# Patient Record
Sex: FEMALE | Race: BLACK OR AFRICAN AMERICAN | NOT HISPANIC OR LATINO | URBAN - METROPOLITAN AREA
[De-identification: names, ages, dates, MRNs, and addresses within clinical notes are randomized per-mention and may not be internally consistent; named-entity substitution may affect disease eponyms.]

---
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
## 2023-07-24 ENCOUNTER — TELEPHONE (OUTPATIENT)
Dept: OBGYN CLINIC | Facility: CLINIC | Age: 18
End: 2023-07-24

## 2023-07-30 NOTE — PROGRESS NOTES
Assessment/Plan:  Open to air at bedtime. Cotton underwear. No thongs. Wear looser fitting clothing. Get out of exercise clothes and bathing suits ASAP. Avoid bathroom wipes, feminine washes/scented soaps. Wash with cetaphil cleanser. Watch sugar and carb intake. Refrain from sexual activity while on treatment. Flagyl 1 tab twice a day for 1 week no alcohol  Diflucan 1 now repeat in 3 days  Valtrex 1 twice a day for 3 days then daily    Start birth control on day one of next menses and use back up method of contraception x 7 days. Rx sent to pharmacy on file. Although always condom use encouraged. Benefits, risks and alternatives of birth control discussed. F/u 3 months if doing ok consider cont active pills only. Diagnoses and all orders for this visit:    Encounter for prescription of oral contraceptives  -     norethindrone-ethinyl estradiol (Junel FE 1/20) 1-20 MG-MCG per tablet; Take 1 tablet by mouth daily    BV (bacterial vaginosis)  -     metroNIDAZOLE (METROGEL) 0.75 % vaginal gel; Insert 1 Application into the vagina daily at bedtime for 5 days    Vaginal yeast infection  -     fluconazole (DIFLUCAN) 150 mg tablet; Take 1 tablet (150 mg total) by mouth once for 1 dose Repeat in 3 days    HSV (herpes simplex virus) anogenital infection  -     valACYclovir (VALTREX) 500 mg tablet; 1 tab twice a day for 3 days then daily    Screen for STD (sexually transmitted disease)  -     Chlamydia/GC amplified DNA by PCR    Other orders  -     Fluoxetine HCl, PMDD, 10 MG TABS; Take 10 mg by mouth daily          Subjective:      Patient ID: Noah Riggs is a 21 y.o. female. New pt here for eval outbreak Had [address] 5/22/2023 at prior OBGYN  No prior obgyn records for review  H/o HSV 2 first outbreak 2 months ago C/o discomfort in vagina intermittently Also noting an odor Has had BV in the past treated with po Flagyl Interested in discussing IUD.  Has sig cramps with period H/o ovarian cystectommy 5/2022 Depression/PMDD on Lexapro       The following portions of the patient's history were reviewed and updated as appropriate: allergies, current medications, past family history, past medical history, past social history, past surgical history and problem list.    Review of Systems   Constitutional: Negative for chills, fatigue and fever. Gastrointestinal: Negative for abdominal pain, constipation and diarrhea. Genitourinary: Positive for menstrual problem, vaginal discharge and vaginal pain. Objective:      BP 90/58   Ht 5' 6.5" (1.689 m)   Wt 83.8 kg (184 lb 12.8 oz)   LMP 07/05/2023 (Approximate)   Breastfeeding No   BMI 29.38 kg/m²          Physical Exam  Vitals and nursing note reviewed. Constitutional:       General: She is not in acute distress. Appearance: Normal appearance. HENT:      Head: Normocephalic and atraumatic. Pulmonary:      Effort: Pulmonary effort is normal.   Chest:   Breasts:     Breasts are symmetrical.      Right: Normal. No mass, nipple discharge, skin change or tenderness. Left: Normal. No mass, nipple discharge, skin change or tenderness. Abdominal:      General: There is no distension. Palpations: Abdomen is soft. Tenderness: There is no abdominal tenderness. There is no guarding or rebound. Genitourinary:     General: Normal vulva. Exam position: Lithotomy position. Labia:         Right: No rash, tenderness, lesion or injury. Left: No rash, tenderness, lesion or injury. Urethra: No prolapse, urethral pain, urethral swelling or urethral lesion. Vagina: Normal. No erythema or lesions. Cervix: No cervical motion tenderness, discharge, lesion or cervical bleeding. Uterus: Normal.       Adnexa: Right adnexa normal and left adnexa normal.        Right: No mass or tenderness. Left: No mass or tenderness. Rectum: No mass or external hemorrhoid.       Comments: G/c from cervix  Musculoskeletal:         General: Normal range of motion. Lymphadenopathy:      Upper Body:      Right upper body: No axillary adenopathy. Left upper body: No axillary adenopathy. Lower Body: No right inguinal adenopathy. No left inguinal adenopathy. Skin:     General: Skin is warm and dry. Neurological:      Mental Status: She is alert and oriented to person, place, and time. Psychiatric:         Mood and Affect: Mood normal.         Behavior: Behavior normal.         Thought Content:  Thought content normal.         Judgment: Judgment normal.

## 2023-07-31 ENCOUNTER — OFFICE VISIT (OUTPATIENT)
Dept: OBGYN CLINIC | Facility: CLINIC | Age: 18
End: 2023-07-31
Payer: COMMERCIAL

## 2023-07-31 VITALS
WEIGHT: 184.8 LBS | SYSTOLIC BLOOD PRESSURE: 90 MMHG | BODY MASS INDEX: 29 KG/M2 | DIASTOLIC BLOOD PRESSURE: 58 MMHG | HEIGHT: 67 IN

## 2023-07-31 DIAGNOSIS — A60.9 HSV (HERPES SIMPLEX VIRUS) ANOGENITAL INFECTION: ICD-10-CM

## 2023-07-31 DIAGNOSIS — N76.0 BV (BACTERIAL VAGINOSIS): ICD-10-CM

## 2023-07-31 DIAGNOSIS — Z30.011 ENCOUNTER FOR PRESCRIPTION OF ORAL CONTRACEPTIVES: Primary | ICD-10-CM

## 2023-07-31 DIAGNOSIS — B96.89 BV (BACTERIAL VAGINOSIS): ICD-10-CM

## 2023-07-31 DIAGNOSIS — B37.31 VAGINAL YEAST INFECTION: ICD-10-CM

## 2023-07-31 DIAGNOSIS — Z11.3 SCREEN FOR STD (SEXUALLY TRANSMITTED DISEASE): ICD-10-CM

## 2023-07-31 PROCEDURE — 99203 OFFICE O/P NEW LOW 30 MIN: CPT | Performed by: NURSE PRACTITIONER

## 2023-07-31 RX ORDER — FLUOXETINE 10 MG/1
10 TABLET ORAL DAILY
COMMUNITY

## 2023-07-31 RX ORDER — FLUCONAZOLE 150 MG/1
150 TABLET ORAL ONCE
Qty: 2 TABLET | Refills: 0 | Status: SHIPPED | OUTPATIENT
Start: 2023-07-31 | End: 2023-07-31

## 2023-07-31 RX ORDER — VALACYCLOVIR HYDROCHLORIDE 500 MG/1
TABLET, FILM COATED ORAL
Qty: 90 TABLET | Refills: 3 | Status: SHIPPED | OUTPATIENT
Start: 2023-07-31 | End: 2024-07-31

## 2023-07-31 RX ORDER — NORETHINDRONE ACETATE AND ETHINYL ESTRADIOL 1MG-20(21)
1 KIT ORAL DAILY
Qty: 84 TABLET | Refills: 0 | Status: SHIPPED | OUTPATIENT
Start: 2023-07-31

## 2023-07-31 RX ORDER — METRONIDAZOLE 7.5 MG/G
1 GEL VAGINAL
Qty: 70 G | Refills: 0 | Status: SHIPPED | OUTPATIENT
Start: 2023-07-31 | End: 2023-08-05

## 2023-07-31 NOTE — PATIENT INSTRUCTIONS
Open to air at bedtime. Cotton underwear. No thongs. Wear looser fitting clothing. Get out of exercise clothes and bathing suits ASAP. Avoid bathroom wipes, feminine washes/scented soaps. Wash with cetaphil cleanser. Watch sugar and carb intake. Refrain from sexual activity while on treatment. Flagyl 1 tab twice a day for 1 week no alcohol  Diflucan 1 now repeat in 3 days  Valtrex 1 twice a day for 3 days then daily    Start birth control on day one of next menses and use back up method of contraception x 7 days. Rx sent to pharmacy on file. Although always condom use encouraged. Benefits, risks and alternatives of birth control discussed. F/u 3 months .

## 2023-08-02 LAB
C TRACH RRNA SPEC QL NAA+PROBE: NEGATIVE
N GONORRHOEA RRNA SPEC QL NAA+PROBE: NEGATIVE

## 2023-08-25 ENCOUNTER — DOCUMENTATION (OUTPATIENT)
Dept: PSYCHIATRY | Facility: CLINIC | Age: 18
End: 2023-08-25

## 2023-08-25 DIAGNOSIS — F32.A DEPRESSION, UNSPECIFIED DEPRESSION TYPE: Primary | ICD-10-CM

## 2023-08-25 NOTE — PROGRESS NOTES
Psychotherapy Discharge Summary    Preferred Name: Jase Saavedra  YOB: 2005    Admission date to psychotherapy: 9/26/2022    Referred by: self     Presenting Problem: management of depression/anxiety, family conflict     Course of treatment included : individual therapy     Progress/Outcome of Treatment Goals (brief summary of course of treatment) Inconsistent with therapy sessions. Treatment Complications (if any): inconsistent with therapy sessions. Treatment Progress: poor    Current [facility] Provider: n/a    Discharge Medications include: n/a    Discharge Date: 8/25/2023    Discharge Diagnosis:   1. Depression, unspecified depression type            Criteria for Discharge: two or more unexcused absences for services    Aftercare recommendations include (include specific referral names and phone numbers, if appropriate): become connected with [facility] services when able to be more consistent with treatment.      Prognosis: fair

## 2024-01-17 ENCOUNTER — TELEPHONE (OUTPATIENT)
Dept: OBGYN CLINIC | Facility: CLINIC | Age: 19
End: 2024-01-17

## 2024-01-17 NOTE — TELEPHONE ENCOUNTER
1/17/24 patient has an upcoming appointment 1/29/24 to go over test results from U/S done at [facility] and STD testing. Patient will have ultrasound results faxed to us from [facility] before her appointment date.

## 2024-01-29 ENCOUNTER — OFFICE VISIT (OUTPATIENT)
Dept: OBGYN CLINIC | Facility: CLINIC | Age: 19
End: 2024-01-29
Payer: COMMERCIAL

## 2024-01-29 VITALS
DIASTOLIC BLOOD PRESSURE: 60 MMHG | SYSTOLIC BLOOD PRESSURE: 90 MMHG | HEIGHT: 66 IN | BODY MASS INDEX: 28.99 KG/M2 | WEIGHT: 180.4 LBS

## 2024-01-29 DIAGNOSIS — Z30.09 BIRTH CONTROL COUNSELING: ICD-10-CM

## 2024-01-29 DIAGNOSIS — N83.201 RIGHT OVARIAN CYST: ICD-10-CM

## 2024-01-29 DIAGNOSIS — Z20.2 POSSIBLE EXPOSURE TO STD: Primary | ICD-10-CM

## 2024-01-29 PROCEDURE — 99214 OFFICE O/P EST MOD 30 MIN: CPT | Performed by: NURSE PRACTITIONER

## 2024-01-29 RX ORDER — VALACYCLOVIR HYDROCHLORIDE 1 G/1
TABLET, FILM COATED ORAL
COMMUNITY
Start: 2024-01-24

## 2024-01-29 NOTE — PROGRESS NOTES
Assessment/Plan:  Reviewed US from ER likely hemorrhagic cyst function of ovulation normally resolve in 2-3 months will repeat US  BC options discussed OCP progesterone IUD R/B f each discussed She elected to try OCP f/u 3 months at that time if doing well without BTB consider continuous active pills No placebos   Still has original 3 packs at home will start with next period          Diagnoses and all orders for this visit:    Possible exposure to STD  -     HIV 1/2 AG/AB W REFLEX [facility] only; Future  -     Hepatitis C antibody; Future  -     RPR, Rfx Qn RPR/Confirm TP; Future  -     Herpes I/II IgG Antibodies; Future  -     HIV 1/2 AG/AB W REFLEX [facility] only  -     Hepatitis C antibody  -     RPR, Rfx Qn RPR/Confirm TP  -     Herpes I/II IgG Antibodies  -     Chlamydia/GC DAREN, Confirmation    Right ovarian cyst  -     US pelvis complete w transvaginal; Future    Birth control counseling    Other orders  -     valACYclovir (VALTREX) 1,000 mg tablet          Subjective:      Patient ID: Ana Jenkins is a 18 y.o. female.    Here for STD testing and to review US done in ER 12/18/2023  Had dev abdominal pain. Reports no history of appendectomy. Had bilateral ovarian cyst removal several years ago reports pain feels similar. Three days ago developed intermittent right lower quadrant abdominal pain. Worse with position changes. US  The uterus measures 7.9 x 3.8 x 5.8 cm. The endometrium is trilaminar and measures up to 13 mm.   Right ovary measures 4.5 x 2.8 x 3.3 cm. Adequate spectral Doppler waveforms are present. Partially imaged 1.8 x 1.6 cm complex cyst in right ovary, likely hemorrhagic follicular cyst.  Left ovary measures 2.9 x 2.4 x 2.6 cm. Adequate spectral Doppler waveforms are present. Please note, the left ovary is poorly visualized.  Small free pelvic fluid. Ct abd/pelvis was normal  was recommended f/u US in 6-8 weeks   Seen in July and was prescribed OCP which she  "states she never started IS potentially interested in IUD to not get her period   She was just seen in the ER 1/25/2024 for lump in left groin She was started on antibiotics but can not with certainty identify which medication. She took a dose yesterday and today but is not getting better raising concern. On inspection she does have a palpable tender lymph node in the left inguinal region.         The following portions of the patient's history were reviewed and updated as appropriate: allergies, current medications, past family history, past medical history, past social history, past surgical history, and problem list.    Review of Systems   Gastrointestinal:  Negative for nausea.   Genitourinary:  Negative for menstrual problem, pelvic pain and vaginal bleeding.   Neurological:  Negative for headaches.   Psychiatric/Behavioral:  Negative for behavioral problems and dysphoric mood.          Objective:      BP 90/60   Ht 5' 5.5\" (1.664 m)   Wt 81.8 kg (180 lb 6.4 oz)   LMP 01/01/2024 (Exact Date)   Breastfeeding No   BMI 29.56 kg/m²          Physical Exam  Vitals and nursing note reviewed.   Constitutional:       General: She is not in acute distress.     Appearance: Normal appearance.   HENT:      Head: Normocephalic and atraumatic.   Pulmonary:      Effort: Pulmonary effort is normal.   Abdominal:      General: There is no distension.      Palpations: Abdomen is soft. There is no mass.      Tenderness: There is no abdominal tenderness.   Genitourinary:     General: Normal vulva.      Exam position: Lithotomy position.      Labia:         Right: No rash or lesion.         Left: No rash or lesion.       Vagina: Normal. No vaginal discharge or erythema.      Cervix: No cervical motion tenderness, discharge, lesion or cervical bleeding.      Uterus: Not enlarged and not tender.       Adnexa:         Right: No mass or tenderness.          Left: No mass or tenderness.        Rectum: Normal.      Comments: G/C from " cervix   Lymphadenopathy:      Lower Body: No right inguinal adenopathy. No left inguinal adenopathy.   Skin:     General: Skin is warm and dry.   Neurological:      General: No focal deficit present.      Mental Status: She is alert and oriented to person, place, and time.   Psychiatric:         Mood and Affect: Mood normal.         Behavior: Behavior normal.         Thought Content: Thought content normal.

## 2024-01-30 NOTE — PATIENT INSTRUCTIONS
Reviewed US from ER likely hemorrhagic cyst function of ovulation normally resolve in 2-3 months will repeat US  BC options discussed OCP progesterone IUD R/B f each discussed She elected to try OCP f/u 3 months at that time if doing well without BTB consider continuous active pills No placebos   Still has original 3 packs at home will start with next period

## 2024-01-31 ENCOUNTER — TELEPHONE (OUTPATIENT)
Dept: OBGYN CLINIC | Facility: CLINIC | Age: 19
End: 2024-01-31

## 2024-01-31 NOTE — TELEPHONE ENCOUNTER
Ana called reporting she is having increased pelvic pain with onset of menses today. Patient states cramping pain is severe causing vomiting. Took either tylenol or ibuprofen(provided by mom) earlier today and vomited 20 min later. Patient asking if she should be evaluated in the ED. Advised ED eval appropriate if she feels she is in severe pain.  Patient verbalized understanding.

## 2024-02-02 LAB
C TRACH RRNA SPEC QL NAA+PROBE: NEGATIVE
N GONORRHOEA RRNA SPEC QL NAA+PROBE: NEGATIVE

## 2024-02-12 ENCOUNTER — HOSPITAL ENCOUNTER (OUTPATIENT)
Dept: ULTRASOUND IMAGING | Facility: HOSPITAL | Age: 19
Discharge: HOME | End: 2024-02-12
Payer: COMMERCIAL

## 2024-02-12 DIAGNOSIS — N83.201 RIGHT OVARIAN CYST: ICD-10-CM

## 2024-02-12 PROCEDURE — 76856 US EXAM PELVIC COMPLETE: CPT

## 2024-02-12 PROCEDURE — 76830 TRANSVAGINAL US NON-OB: CPT

## 2024-02-21 ENCOUNTER — TELEPHONE (OUTPATIENT)
Age: 19
End: 2024-02-21

## 2024-02-21 NOTE — TELEPHONE ENCOUNTER
Spoke with Ana,whom states has been bleeding & some cramping pains since missed taking a pill on Thursday 02/15. She did take two OCPs at same time as normal on Friday 02/16. Ana is currently on fourth day of third week. Informed Ana to stop taking pills for three days,get bleed and then restart a new pack on day 4. Also [employer] bleeding dates on current pill pack. Keep upcoming appt with SHERRY Velarde.. She denied any recent coitus.

## 2024-02-21 NOTE — TELEPHONE ENCOUNTER
Patient has a question about her ocps  Patient missed a pill last Thursday , pt doubled up on the next pill but has been bleeding since , not heavy she has cramping and she thought the bleeding should have stopped by now

## 2024-04-14 NOTE — PROGRESS NOTES
Assessment/Plan:    Preg test neg  Doing well with BCP OK to take continuous eliminate placebos If start with a period hold pill x 4 days then resume active pills Call with issues   Condoms for prevention of STD  G/C obtained results in protal in a few days     Diagnoses and all orders for this visit:    Possible pregnancy, not confirmed  -     POCT urine HCG    Encounter for prescription of oral contraceptives  -     norethindrone-ethinyl estradiol (Junel FE 1/20) 1-20 MG-MCG per tablet; Take 1 tablet by mouth daily Continuous active pills eliminate placebo    Possible exposure to STD  -     Cancel: Chlamydia/GC DAREN, Confirmation  -     Chlamydia/GC DAREN, Confirmation    HSV (herpes simplex virus) anogenital infection  -     valACYclovir (VALTREX) 500 mg tablet; 1 tab daily    Dysmenorrhea  Comments:  take continuous active pill eliminate placcebo    Vaginal discharge  -     POCT wet mount    Other orders  -     albuterol (PROVENTIL HFA,VENTOLIN HFA) 90 mcg/act inhaler; USE 2 puffs inhaled EVERY 6 HOURS AS NEEDED          Subjective:      Patient ID: Ana Jenkins is a 18 y.o. female.    Here for STD testing Also desires preg test having sx  breast tenderness upset stomach similar to before whn preg Hooked up with ex and would not be good. On OCP denies missed pills periods monthly LMP 3/15/2024  seen last in Jan in F/u to prob Hemorr cyst right ovary measuring 1.8 cm On US in ER in Dec  She had repeat US 2/2024 which showed 1.6 cm hem follicle Has sig cramps with period H/o ovarian cystectommy 5/2022 Depression/PMDD on Lexapro She was started on OCP with plan at pill check to discuss continuous OCP to help with cramps H/o HSV 2 requesting to go back on suppressive treatment         The following portions of the patient's history were reviewed and updated as appropriate: allergies, current medications, past family history, past medical history, past social history, past surgical history, and problem  "list.    Review of Systems   Respiratory:  Negative for shortness of breath.    Cardiovascular:  Negative for chest pain and leg swelling.   Genitourinary:  Positive for pelvic pain. Negative for menstrual problem, vaginal bleeding and vaginal discharge.   Neurological:  Negative for headaches.   Psychiatric/Behavioral:  Negative for dysphoric mood. The patient is not nervous/anxious.          Objective:      BP 92/58 (BP Location: Left arm, Patient Position: Sitting, Cuff Size: Large)   Ht 5' 5.25\" (1.657 m)   Wt 83.9 kg (185 lb)   LMP 03/15/2024 (Approximate)   BMI 30.55 kg/m²          Physical Exam  Vitals and nursing note reviewed.   Constitutional:       General: She is not in acute distress.     Appearance: Normal appearance.   HENT:      Head: Normocephalic and atraumatic.   Pulmonary:      Effort: Pulmonary effort is normal.   Abdominal:      General: There is no distension.      Palpations: Abdomen is soft. There is no mass.      Tenderness: There is no abdominal tenderness.   Genitourinary:     General: Normal vulva.      Exam position: Lithotomy position.      Labia:         Right: No rash or lesion.         Left: No rash or lesion.       Vagina: Normal. No vaginal discharge or erythema.      Cervix: No cervical motion tenderness, discharge, lesion or cervical bleeding.      Uterus: Not enlarged and not tender.       Adnexa:         Right: No mass or tenderness.          Left: No mass or tenderness.        Rectum: Normal.      Comments: G/C from cervix   Lymphadenopathy:      Lower Body: No right inguinal adenopathy. No left inguinal adenopathy.   Skin:     General: Skin is warm and dry.   Neurological:      General: No focal deficit present.      Mental Status: She is alert and oriented to person, place, and time.   Psychiatric:         Mood and Affect: Mood normal.         Behavior: Behavior normal.         Thought Content: Thought content normal.         "

## 2024-04-15 ENCOUNTER — OFFICE VISIT (OUTPATIENT)
Dept: OBGYN CLINIC | Facility: CLINIC | Age: 19
End: 2024-04-15
Payer: COMMERCIAL

## 2024-04-15 VITALS
DIASTOLIC BLOOD PRESSURE: 58 MMHG | HEIGHT: 65 IN | WEIGHT: 185 LBS | SYSTOLIC BLOOD PRESSURE: 92 MMHG | BODY MASS INDEX: 30.82 KG/M2

## 2024-04-15 DIAGNOSIS — Z30.011 ENCOUNTER FOR PRESCRIPTION OF ORAL CONTRACEPTIVES: ICD-10-CM

## 2024-04-15 DIAGNOSIS — Z20.2 POSSIBLE EXPOSURE TO STD: ICD-10-CM

## 2024-04-15 DIAGNOSIS — N94.6 DYSMENORRHEA: ICD-10-CM

## 2024-04-15 DIAGNOSIS — N89.8 VAGINAL DISCHARGE: ICD-10-CM

## 2024-04-15 DIAGNOSIS — A60.9 HSV (HERPES SIMPLEX VIRUS) ANOGENITAL INFECTION: ICD-10-CM

## 2024-04-15 DIAGNOSIS — Z32.00 POSSIBLE PREGNANCY, NOT CONFIRMED: Primary | ICD-10-CM

## 2024-04-15 LAB
BV WHIFF TEST VAG QL: NORMAL
CLUE CELLS SPEC QL WET PREP: NORMAL
PH SMN: 3.5 [PH]
SL AMB POCT URINE HCG: NEGATIVE
SL AMB POCT WET MOUNT: NORMAL
T VAGINALIS VAG QL WET PREP: NORMAL
YEAST VAG QL WET PREP: NORMAL

## 2024-04-15 PROCEDURE — 99214 OFFICE O/P EST MOD 30 MIN: CPT | Performed by: NURSE PRACTITIONER

## 2024-04-15 PROCEDURE — 87210 SMEAR WET MOUNT SALINE/INK: CPT | Performed by: NURSE PRACTITIONER

## 2024-04-15 PROCEDURE — 81025 URINE PREGNANCY TEST: CPT | Performed by: NURSE PRACTITIONER

## 2024-04-15 RX ORDER — NORETHINDRONE ACETATE AND ETHINYL ESTRADIOL 1MG-20(21)
1 KIT ORAL DAILY
Qty: 84 TABLET | Refills: 1 | Status: SHIPPED | OUTPATIENT
Start: 2024-04-15

## 2024-04-15 RX ORDER — VALACYCLOVIR HYDROCHLORIDE 500 MG/1
TABLET, FILM COATED ORAL
Qty: 90 TABLET | Refills: 3 | Status: SHIPPED | OUTPATIENT
Start: 2024-04-15 | End: 2025-04-16

## 2024-04-15 RX ORDER — ALBUTEROL SULFATE 90 UG/1
AEROSOL, METERED RESPIRATORY (INHALATION)
COMMUNITY
Start: 2024-02-22

## 2024-04-15 NOTE — PATIENT INSTRUCTIONS
Preg test neg  Doing well with BCP OK to take continuous eliminate placebos If start with a period hold pill x 4 days then resume active pills Call with issues   Condoms for prevention of STD  G/C obtained results in protal in a few days

## 2024-04-18 LAB
C TRACH RRNA SPEC QL NAA+PROBE: NEGATIVE
N GONORRHOEA RRNA SPEC QL NAA+PROBE: NEGATIVE

## 2024-05-08 ENCOUNTER — OFFICE VISIT (OUTPATIENT)
Dept: OBGYN CLINIC | Facility: CLINIC | Age: 19
End: 2024-05-08
Payer: COMMERCIAL

## 2024-05-08 VITALS
BODY MASS INDEX: 29.82 KG/M2 | WEIGHT: 179 LBS | HEIGHT: 65 IN | DIASTOLIC BLOOD PRESSURE: 60 MMHG | SYSTOLIC BLOOD PRESSURE: 108 MMHG

## 2024-05-08 DIAGNOSIS — N88.2 CERVICAL STENOSIS (UTERINE CERVIX): Primary | ICD-10-CM

## 2024-05-08 PROCEDURE — 99212 OFFICE O/P EST SF 10 MIN: CPT | Performed by: NURSE PRACTITIONER

## 2024-05-08 RX ORDER — MISOPROSTOL 100 UG/1
TABLET ORAL
Qty: 1 TABLET | Refills: 0 | Status: SHIPPED | OUTPATIENT
Start: 2024-05-08

## 2024-05-08 NOTE — PROGRESS NOTES
"Assessment/Plan:    No problem-specific Assessment & Plan notes found for this encounter.       There are no diagnoses linked to this encounter.      Subjective:      Patient ID: Ana Jenkins is a 18 y.o. female.    Here to discuss IUD Interested in Progesterone IUD LMP last week came off OCP Not SA          The following portions of the patient's history were reviewed and updated as appropriate: allergies, current medications, past family history, past medical history, past social history, past surgical history, and problem list.    Review of Systems   Genitourinary:  Negative for menstrual problem, vaginal bleeding and vaginal discharge.   Neurological:  Negative for headaches.   Psychiatric/Behavioral:  Negative for dysphoric mood. The patient is not nervous/anxious.          Objective:      Ht 5' 5.25\" (1.657 m)   Wt 81.2 kg (179 lb)   LMP 04/29/2024 (Approximate)   BMI 29.56 kg/m²          Physical Exam  Constitutional:       Appearance: Normal appearance.   HENT:      Head: Normocephalic and atraumatic.   Neurological:      General: No focal deficit present.      Mental Status: She is alert and oriented to person, place, and time.   Psychiatric:         Mood and Affect: Mood normal.         Behavior: Behavior normal.         "

## 2024-05-10 NOTE — PROGRESS NOTES
Here for Mirena insertion Accompanied by Mom  Off OCP LMP 4/29/2024 Denies unprotected sex  RX cytotec prescribed did not use as trauma from prior termination   Iud insertions    Date/Time: 5/13/2024 12:30 PM    Performed by: SHERRY Stapleton  Authorized by: SHERRY Stapleton    Other Assisting Provider: Yes (comment)    Written consent obtained?: Yes    Risks and benefits: Risks, benefits and alternatives were discussed    Consent given by:  Patient  Patient states understanding of procedure being performed: Yes    Patient identity confirmed:  Verbally with patient  Procedure:     Negative urine pregnancy test: yes      Cervix cleaned and prepped: yes      Speculum placed in vagina: yes      Tenaculum applied to cervix: yes      Uterus sounded: yes      Uterus sound depth (cm):  8    IUD inserted with no complications: yes      IUD type:  Mirena    Strings trimmed: yes    Post-procedure:     Patient tolerated procedure well: yes      Patient will follow up after next period: yes    Comments:      Mirena IUD inserted as requested. Spotty irregular bleeding may persist up to 6 months. Use backup method of birth control ×7 days. Always condom use for STI prevention. Return to office in 4 weeks after next menses. Call office with any bright red or excessive bleeding, fever, severe pelvic pain or foul discharge. C. May repeat motrin dose 4 hours from last dose with food. Motrin 200mg 3 tabs every 6 hours with food today and tomorrow then as needed

## 2024-05-13 ENCOUNTER — PROCEDURE VISIT (OUTPATIENT)
Dept: OBGYN CLINIC | Facility: CLINIC | Age: 19
End: 2024-05-13
Payer: COMMERCIAL

## 2024-05-13 VITALS
DIASTOLIC BLOOD PRESSURE: 62 MMHG | BODY MASS INDEX: 30.12 KG/M2 | WEIGHT: 180.8 LBS | SYSTOLIC BLOOD PRESSURE: 102 MMHG | HEIGHT: 65 IN

## 2024-05-13 DIAGNOSIS — Z32.02 PREGNANCY EXAMINATION OR TEST, NEGATIVE RESULT: ICD-10-CM

## 2024-05-13 DIAGNOSIS — Z30.430 ENCOUNTER FOR INSERTION OF PROGESTIN-RELEASING INTRAUTERINE CONTRACEPTIVE DEVICE (IUD): Primary | ICD-10-CM

## 2024-05-13 LAB — SL AMB POCT URINE HCG: NEGATIVE

## 2024-05-13 PROCEDURE — 81025 URINE PREGNANCY TEST: CPT | Performed by: NURSE PRACTITIONER

## 2024-05-13 PROCEDURE — 58300 INSERT INTRAUTERINE DEVICE: CPT | Performed by: NURSE PRACTITIONER

## 2024-05-13 NOTE — PATIENT INSTRUCTIONS
Mirena  IUD inserted as requested. Spotty irregular bleeding may persist up to 6 months. Use backup method of birth control ×7 days. Always condom use for STI prevention. Return to office in 4 weeks after next menses. Call office with any bright red or excessive bleeding, fever, severe pelvic pain or foul discharge. C. May repeat motrin dose 4 hours from last dose with food. Motrin 200mg 3 tabs every 6 hours with food today and tomorrow then as needed

## 2024-06-12 ENCOUNTER — NURSE TRIAGE (OUTPATIENT)
Age: 19
End: 2024-06-12

## 2024-06-12 DIAGNOSIS — N76.0 BACTERIAL VAGINOSIS: Primary | ICD-10-CM

## 2024-06-12 DIAGNOSIS — B96.89 BACTERIAL VAGINOSIS: Primary | ICD-10-CM

## 2024-06-12 RX ORDER — METRONIDAZOLE 7.5 MG/G
1 GEL VAGINAL
Qty: 70 G | Refills: 0 | Status: SHIPPED | OUTPATIENT
Start: 2024-06-12 | End: 2024-06-17

## 2024-06-12 NOTE — TELEPHONE ENCOUNTER
"Patient called c/o watery discharge and fishy odor. States this happens often after wearing tampons. States she has tried many different brands and sizes.Denies itching, fevers, pain with urination. Patient typically sees Estefani and previously prescribed Metrogel. Patient is requesting Metrogel again. Confirmed [facility] pharmacy. Message routed to provider.      Reason for Disposition   All other vaginal symptoms (Exception: feels like prior yeast infection, minor abrasion, mild rash < 24 hour duration, mild itching)    Answer Assessment - Initial Assessment Questions  1. SYMPTOM: \"What's the main symptom you're concerned about?\" (e.g., pain, itching, dryness)      Watery discharge, fishy odor    3. ONSET: \"When did the  symptoms  start?\"      1-2 days  4. PAIN: \"Is there any pain?\" If Yes, ask: \"How bad is it?\" (Scale: 1-10; mild, moderate, severe)      denies  5. ITCHING: \"Is there any itching?\" If Yes, ask: \"How bad is it?\" (Scale: 1-10; mild, moderate, severe)      denies  6. CAUSE: \"What do you think is causing the discharge?\" \"Have you had the same problem before? What happened then?\"      tampons  7. OTHER SYMPTOMS: \"Do you have any other symptoms?\" (e.g., fever, itching, vaginal bleeding, pain with urination, injury to genital area, vaginal foreign body)      denies  8. PREGNANCY: \"Is there any chance you are pregnant?\" \"When was your last menstrual period?\"      5/30/24    Protocols used: Vaginal Symptoms-ADULT-[place]    "

## 2024-07-08 ENCOUNTER — NURSE TRIAGE (OUTPATIENT)
Age: 19
End: 2024-07-08

## 2024-07-08 DIAGNOSIS — N76.0 BACTERIAL VAGINOSIS: Primary | ICD-10-CM

## 2024-07-08 DIAGNOSIS — B96.89 BACTERIAL VAGINOSIS: Primary | ICD-10-CM

## 2024-07-08 RX ORDER — METRONIDAZOLE 7.5 MG/G
1 GEL VAGINAL
Qty: 5 G | Refills: 0 | Status: SHIPPED | OUTPATIENT
Start: 2024-07-08 | End: 2024-07-13

## 2024-07-08 NOTE — TELEPHONE ENCOUNTER
Patient returned call d/t change in vaginal discharge & odor. Attempted warm transfer. Please reach out to pt to discuss

## 2024-07-08 NOTE — TELEPHONE ENCOUNTER
"Patient is calling in, she has a hx of BV. She reports a foul smelling water discharge for the last two days. She states she called last month and nobody ever called her back about medication being sent in. It looks like Metrogel was ordered but was \"phoned in.\" I don't think the Rx was ever sent to the pharmacy. Will ordered Metrogel again per protocol. If symptoms do not improve she is to be seen in the office    If patient has history of BV in prior two years, prescribe:    -Metrogel Intravaginally x 5 nights, with no refills.    If patient refuses the intravaginal medication and is requesting a PO medication, prescribe:    -Flagyl 500mg BID PO x 7 days, with no refills    Medication instructions:  Please instruct patient that they cannot drink alcohol while on Flagyl.    Please instruct patient that they should not have sex while on treatment or 3 days after if using Metrogel.    If patient is having recurrent BV infections, please schedule appointment (should be seen within 5 days).          Answer Assessment - Initial Assessment Questions  1. DISCHARGE: \"Describe the discharge.\" (e.g., white, yellow, green, gray, foamy, cottage cheese-like)      None or watery   2. ODOR: \"Is there a bad odor?\"      Foul smelling   3. ONSET: \"When did the discharge begin?\"      1-2 days ago   4. RASH: \"Is there a rash in that area?\" If Yes, ask: \"Describe it.\" (e.g., redness, blisters, sores, bumps)      Denies   5. ABDOMINAL PAIN: \"Are you having any abdominal pain?\" If Yes, ask: \"What does it feel like? \" (e.g., crampy, dull, intermittent, constant)       Denies   6. ABDOMINAL PAIN SEVERITY: If present, ask: \"How bad is it?\"  (e.g., mild, moderate, severe)   - MILD - doesn't interfere with normal activities    - MODERATE - interferes with normal activities or awakens from sleep    - SEVERE - patient doesn't want to move (R/O peritonitis)       Denies   7. CAUSE: \"What do you think is causing the discharge?\" \"Have you had the " "same problem before? What happened then?\"      Hx of BV   8. OTHER SYMPTOMS: \"Do you have any other symptoms?\" (e.g., fever, itching, vaginal bleeding, pain with urination, injury to genital area, vaginal foreign body)      Having some itching/irritation    Protocols used: Vaginal Discharge-ADULT-[place]    "

## 2024-07-08 NOTE — TELEPHONE ENCOUNTER
Regarding: poss bv  ----- Message from Gregory COLE sent at 7/8/2024 11:09 AM EDT -----  Pt called. Pt stated she is having changed in discharge and odor. Warm transfer to [facility], no answer. Pt made aware of message sent.

## 2024-07-22 ENCOUNTER — TELEPHONE (OUTPATIENT)
Age: 19
End: 2024-07-22

## 2024-07-22 NOTE — TELEPHONE ENCOUNTER
Patient called, advised they were at [facility] ED 7/19 due to pelvic pain. ED confirmed pt IUD shifted per cat scan. Attempted warm transfer to triage, unavailable. S/w Carito in office, okay to schedule as ovs with Estef[facility] since they have availability tomorrow

## 2024-07-24 ENCOUNTER — OFFICE VISIT (OUTPATIENT)
Dept: OBGYN CLINIC | Facility: CLINIC | Age: 19
End: 2024-07-24
Payer: COMMERCIAL

## 2024-07-24 VITALS
DIASTOLIC BLOOD PRESSURE: 64 MMHG | SYSTOLIC BLOOD PRESSURE: 114 MMHG | BODY MASS INDEX: 29.82 KG/M2 | HEIGHT: 65 IN | WEIGHT: 179 LBS

## 2024-07-24 DIAGNOSIS — Z30.432 ENCOUNTER FOR IUD REMOVAL: ICD-10-CM

## 2024-07-24 DIAGNOSIS — T83.32XA IUD MIGRATION, INITIAL ENCOUNTER: Primary | ICD-10-CM

## 2024-07-24 DIAGNOSIS — Z30.09 BIRTH CONTROL COUNSELING: ICD-10-CM

## 2024-07-24 PROCEDURE — 58301 REMOVE INTRAUTERINE DEVICE: CPT | Performed by: NURSE PRACTITIONER

## 2024-07-24 PROCEDURE — 99212 OFFICE O/P EST SF 10 MIN: CPT | Performed by: NURSE PRACTITIONER

## 2024-07-24 NOTE — PROGRESS NOTES
"Assessment/Plan:  IUD removed in its entirety and visualized by patient.Use back up method of contraception immediately with sex. Aware of possible irregular menses post removal.   Dysmenorrhea discussed cont active pill Nuva ring   She will use condoms for now Call if desires other option          Diagnoses and all orders for this visit:    IUD migration, initial encounter    Encounter for IUD removal    Birth control counseling    Other orders  -     Iud removal          Subjective:      Patient ID: Ana Jenkins is a 18 y.o. female.    Here for ER f/u Pt went to [facility] ED 7/19 due to pelvic pain. ED confirmed pt IUD shifted per cat scan it is in the lower uterine segment cervix. 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    Review of Systems   Gastrointestinal:  Negative for abdominal pain.   Genitourinary:  Positive for pelvic pain. Negative for dyspareunia, menstrual problem, vaginal bleeding, vaginal discharge and vaginal pain.         Objective:      /64 (BP Location: Left arm, Patient Position: Sitting, Cuff Size: Standard)   Ht 5' 5.25\" (1.657 m)   Wt 81.2 kg (179 lb)   LMP 06/30/2024   BMI 29.56 kg/m²          Physical Exam  Vitals and nursing note reviewed.   Constitutional:       Appearance: Normal appearance.   Abdominal:      Palpations: Abdomen is soft.      Tenderness: There is no abdominal tenderness.   Genitourinary:     General: Normal vulva.      Exam position: Lithotomy position.      Vagina: Normal.      Cervix: No cervical " motion tenderness or cervical bleeding.      Uterus: Normal.       Comments: IUD strings initially seen able to get with string finder  Neurological:      Mental Status: She is alert and oriented to person, place, and time.   Psychiatric:         Mood and Affect: Mood normal.       Iud removal    Performed by: SHERRY Stapleton  Authorized by: SHERRY Stapleton    Procedure: IUD removal    Consent obtained by patient, parent, or legal power of  - including discussion of procedure risks and benefits, patient questions answered, and patient education provided.: yes    Reason for removal: malposition    Strings visualized: yes    IUD grasped by forceps: yes    IUD removed: yes    Date/Time of Removal:  7/24/2024 12:47 PM  Removed without complications: yes    IUD intact: yes

## 2024-07-24 NOTE — PATIENT INSTRUCTIONS
IUD removed in its entirety and visualized by patient.Use back up method of contraception immediately with sex. Aware of possible irregular menses post removal.   Dysmenorrhea discussed cont active pill Nuva ring   She will use condoms for now Call if desires other option

## 2024-08-14 ENCOUNTER — NURSE TRIAGE (OUTPATIENT)
Age: 19
End: 2024-08-14

## 2024-08-14 NOTE — TELEPHONE ENCOUNTER
"Spoke with patient who reports RLQ pain started last night.  She reports hx of ovarian cysts that have required surgery and it feels the same.  She reports increased pain laying on right side.  She reports it comes and goes.  Offered pt appointment for tomorrow but declined, she would like to see SHERRY Jara.  Appointment made for Monday with preferred provider.  Pt was advised to go to the ER if pain becomes severe.  No further questions or concerns at this time.    Reason for Disposition   MODERATE pain (e.g., interferes with normal activities that comes and goes (cramps) lasts > 24 hours (Exception: pain with Vomiting or Diarrhea - see that Protocol)    Answer Assessment - Initial Assessment Questions  1. LOCATION: \"Where does it hurt?\"       RLQ  2. RADIATION: \"Does the pain shoot anywhere else?\" (e.g., chest, back)      denies  3. ONSET: \"When did the pain begin?\" (e.g., minutes, hours or days ago)       Last night  4. SUDDEN: \"Gradual or sudden onset?\"      sudden  5. PATTERN \"Does the pain come and go, or is it constant?\"     - If constant: \"Is it getting better, staying the same, or worsening?\"       (Note: Constant means the pain never goes away completely; most serious pain is constant and it progresses)      - If intermittent: \"How long does it last?\" \"Do you have pain now?\"      (Note: Intermittent means the pain goes away completely between bouts)      Comes and goes  6. SEVERITY: \"How bad is the pain?\"  (e.g., Scale 1-10; mild, moderate, or severe)    - MILD (1-3): doesn't interfere with normal activities, abdomen soft and not tender to touch     - MODERATE (4-7): interferes with normal activities or awakens from sleep, tender to touch     - SEVERE (8-10): excruciating pain, doubled over, unable to do any normal activities       6/10 - has not taken anything.  7. RECURRENT SYMPTOM: \"Have you ever had this type of stomach pain before?\" If Yes, ask: \"When was the last time?\" and \"What happened that " "time?\"       Yes, was a cyst. Has had surgery previously for them.  8. CAUSE: \"What do you think is causing the stomach pain?\"      cyst  9. RELIEVING/AGGRAVATING FACTORS: \"What makes it better or worse?\" (e.g., movement, antacids, bowel movement)      Laying on right side makes worse  10. OTHER SYMPTOMS: \"Has there been any vomiting, diarrhea, constipation, or urine problems?\"        Diarrhea today.  11. PREGNANCY: \"Is there any chance you are pregnant?\" \"When was your last menstrual period?\"        8/1/24    Protocols used: Abdominal Pain - Female-ADULT-[place]    "

## 2024-09-24 ENCOUNTER — TELEPHONE (OUTPATIENT)
Age: 19
End: 2024-09-24

## 2024-09-24 NOTE — TELEPHONE ENCOUNTER
Patient would like an HCG test ordered. She is also asking if there is a urine test also that can be ordered as well. Please advise.

## 2024-09-24 NOTE — TELEPHONE ENCOUNTER
Call to pt. States she is sexually active and started with pregnancy symptoms within the last few days- intermittent dizziness, urinating more frequently, nausea. LMP  8/30/24. Pt has not taken an at home pregnancy test yet. Denies dysuria, hematuria, vaginal bleeding, back pain or fever. Advised will review with provider but she should take an at home pregnancy test in the meantime and call back with positive result. Pt verbalized understanding and is thankful.

## 2024-09-25 NOTE — TELEPHONE ENCOUNTER
Call to pt and advised to start with home pregnancy test first and then if no period in a week, HCG can be ordered for her. She verbalized understanding and is thankful.

## 2024-10-15 DIAGNOSIS — A60.9 HSV (HERPES SIMPLEX VIRUS) ANOGENITAL INFECTION: ICD-10-CM

## 2024-10-15 RX ORDER — VALACYCLOVIR HYDROCHLORIDE 500 MG/1
TABLET, FILM COATED ORAL
Qty: 90 TABLET | Refills: 1 | Status: SHIPPED | OUTPATIENT
Start: 2024-10-15 | End: 2025-10-16

## 2024-10-15 NOTE — TELEPHONE ENCOUNTER
Reason for call:   [x] Refill   [] Prior Auth  [] Other:     Office:   [] PCP/Provider -   [x] Specialty/Provider - [facility]     Medication: valACYclovir (VALTREX) 500 mg tablet     Dose/Frequency: 1 tab daily     Quantity: 90    Pharmacy: [facility] - [place], [place] - [address]      Does the patient have enough for 3 days?   [] Yes   [x] No - Send as HP to POD

## 2025-04-10 ENCOUNTER — NURSE TRIAGE (OUTPATIENT)
Age: 20
End: 2025-04-10

## 2025-04-10 NOTE — TELEPHONE ENCOUNTER
"FOLLOW UP: Go to ED for eval of 10/10 pain and heavy vaginal bleeding    REASON FOR CONVERSATION: Menstrual Problem    SYMPTOMS: patient report 10/10 pelvic pain and saturating overnight pad in 1 hr with many clots of varying sizes. She reports ongoing concerns with painful heavy periods but this is worse. Report vomiting and syncope with pain.    OTHER: Reviewed bleeding precautions, advised ED eval. Appointment scheduled for Monday 4/14. Offered sooner appointments for later today and tomorrow but patient declines. She wants to see Estefani in [place].    DISPOSITION: Go to ED      Reason for Disposition   SEVERE pain (e.g., excruciating cramps) and worse than ever before and not improved with ibuprofen or naproxen   MODERATE vaginal bleeding (e.g., soaking 1 pad or tampon per hour and present > 6 hours; 1 menstrual cup every 6 hours)    Answer Assessment - Initial Assessment Questions  1. LOCATION: \"Where does it hurt?\"       Pelvic cramps  2. ONSET: \"When did this episode of pain begin?\"        A few years but getting work  3. SEVERITY: \"How bad is the pain?\" \"Are you missing school or work because of the pain?\"  (e.g., Scale 1-10; mild, moderate, or severe)      10/10  4. VAGINAL BLEEDING: \"Describe the bleeding that you are having.\" \"How much bleeding is there?\"       Moderate to heavy -- heavy is saturating a pad/hr with many clots  5. MENSTRUAL HISTORY:  \"When did this menstrual period begin?\", \"Is this a normal period for you?\"        Pain and bleeding worsening overtime  6. LMP:  \"When did your last menstrual period begin?\"      today  7. OTHER SYMPTOMS: \"Do you have any other symptoms?\" (e.g., back pain, diarrhea, dizzy or lightheaded, fever, urination pain, vaginal discharge, vomiting)      Notes vomiting and syncope at times with cramps  8. PREGNANCY: \"Is there any chance you are pregnant?\" (e.g., unprotected intercourse, missed birth control pill, broken condom)      Denies day one of cycle " today    Midol, ibuprofen, advil - in effective.    Protocols used: Abdominal Pain - Menstrual Cramps-Adult-[place]

## 2025-04-13 NOTE — PROGRESS NOTES
Assessment/Plan:  Painful periods discussed management birth control or NSAIDS Does not want birth control  Worried she has endometriosis Discussed endometriosis DX surgically first line management is birth control Don't like to jump to surgery secondline treatment is medication which puts you in menopause can only use for 2 years Do not do that without surgery first nor prior to having children  Even if she were to have surgery which confirmed endometriosis subsequent management would be birth control to keep under control  Will check US to check for abnormalities  Strongly recommend birth control   Concerned she has not gotten pregnant although has not tried Recommended if really desires to attempt pregnancy with timing of intercourse ovulation predictor         Diagnoses and all orders for this visit:    Encounter for gynecological examination without abnormal finding    Dysmenorrhea  -     US pelvis complete w transvaginal; Future    Encounter for Papanicolaou smear for cervical cancer screening  -     IGP, CtNg, rfx Aptima HPV ASCU    Screen for STD (sexually transmitted disease)  -     IGP, CtNg, rfx Aptima HPV ASCU    Other orders  -     dicyclomine (BENTYL) 20 mg tablet; Take 20 mg by mouth 2 (two) times a day  -     ondansetron (ZOFRAN) 4 mg tablet; Take 4 mg by mouth every 6 (six) hours  -     acetaminophen (TYLENOL) 325 mg tablet; Take 650 mg by mouth          Subjective:      Patient ID: Ana Jenkins is a 19 y.o. female.     termination Here to discuss painful periods Pt seen in ER 4/10/2025 with period and has hx of severe abdominal cramping and increased bleeding with periods. Pt reports she was on birth control to help with cramping but did not work Was on OCP which inititally tolerated discussed cont OCP at pill check and then came off as not SA did not want hormones Then decided to try IUD Had Mirena placed 2024 Did not f/u for IUD check states insertion was awful She went to  "the ER with pelvic pain 7/19/2024 CT showed IUD noted in lower uterine segment She has not been on BC since.  She has had pain with intercourse sometimes She is worried about endometriosis as well as issue with infertility as has not gotten pregnant although not actively trying.   H/o ovarian cystectomy 5/2022 Depression/PMDD on Lexapro  H/o HSV 2 back on suppressive treatment Going to school for LPN         The following portions of the patient's history were reviewed and updated as appropriate: allergies, current medications, past family history, past medical history, past social history, past surgical history, and problem list.    Review of Systems   Constitutional:  Negative for chills and fever.   Gastrointestinal:  Negative for abdominal pain.   Genitourinary:  Positive for menstrual problem. Negative for dyspareunia and dysuria.   Psychiatric/Behavioral:  Negative for dysphoric mood. The patient is not nervous/anxious.          Objective:      /70 (BP Location: Right arm, Patient Position: Sitting, Cuff Size: Standard)   Ht 5' 5.25\" (1.657 m)   Wt 83.9 kg (185 lb)   LMP 04/10/2025   BMI 30.55 kg/m²          Physical Exam  Vitals and nursing note reviewed.   Constitutional:       General: She is not in acute distress.     Appearance: Normal appearance.   HENT:      Head: Normocephalic and atraumatic.   Pulmonary:      Effort: Pulmonary effort is normal.   Chest:   Breasts:     Breasts are symmetrical.      Right: Normal. No mass, nipple discharge, skin change or tenderness.      Left: Normal. No mass, nipple discharge, skin change or tenderness.   Abdominal:      General: There is no distension.      Palpations: Abdomen is soft.      Tenderness: There is no abdominal tenderness. There is no guarding or rebound.   Genitourinary:     General: Normal vulva.      Exam position: Lithotomy position.      Labia:         Right: No rash, tenderness, lesion or injury.         Left: No rash, tenderness, lesion " or injury.       Urethra: No prolapse, urethral pain, urethral swelling or urethral lesion.      Vagina: Normal. No erythema or lesions.      Cervix: No cervical motion tenderness, discharge, lesion or cervical bleeding.      Uterus: Normal.       Adnexa: Right adnexa normal and left adnexa normal.        Right: No mass or tenderness.          Left: No mass or tenderness.        Rectum: No mass or external hemorrhoid.      Comments: PAP from cervix   Musculoskeletal:         General: Normal range of motion.   Lymphadenopathy:      Upper Body:      Right upper body: No axillary adenopathy.      Left upper body: No axillary adenopathy.      Lower Body: No right inguinal adenopathy. No left inguinal adenopathy.   Skin:     General: Skin is warm and dry.   Neurological:      Mental Status: She is alert and oriented to person, place, and time.   Psychiatric:         Mood and Affect: Mood normal.         Behavior: Behavior normal.         Thought Content: Thought content normal.         Judgment: Judgment normal.

## 2025-04-14 ENCOUNTER — OFFICE VISIT (OUTPATIENT)
Dept: OBGYN CLINIC | Facility: CLINIC | Age: 20
End: 2025-04-14
Payer: COMMERCIAL

## 2025-04-14 VITALS
HEIGHT: 65 IN | WEIGHT: 185 LBS | BODY MASS INDEX: 30.82 KG/M2 | SYSTOLIC BLOOD PRESSURE: 114 MMHG | DIASTOLIC BLOOD PRESSURE: 70 MMHG

## 2025-04-14 DIAGNOSIS — N94.6 DYSMENORRHEA: ICD-10-CM

## 2025-04-14 DIAGNOSIS — Z01.419 ENCOUNTER FOR GYNECOLOGICAL EXAMINATION WITHOUT ABNORMAL FINDING: Primary | ICD-10-CM

## 2025-04-14 DIAGNOSIS — Z11.3 SCREEN FOR STD (SEXUALLY TRANSMITTED DISEASE): ICD-10-CM

## 2025-04-14 DIAGNOSIS — Z12.4 ENCOUNTER FOR PAPANICOLAOU SMEAR FOR CERVICAL CANCER SCREENING: ICD-10-CM

## 2025-04-14 PROCEDURE — 99395 PREV VISIT EST AGE 18-39: CPT | Performed by: NURSE PRACTITIONER

## 2025-04-14 RX ORDER — ACETAMINOPHEN 325 MG/1
650 TABLET ORAL
COMMUNITY
Start: 2025-04-10 | End: 2025-04-20

## 2025-04-14 RX ORDER — ONDANSETRON 4 MG/1
4 TABLET, FILM COATED ORAL EVERY 6 HOURS
COMMUNITY
Start: 2025-04-10 | End: 2025-04-17

## 2025-04-14 RX ORDER — DICYCLOMINE HCL 20 MG
20 TABLET ORAL 2 TIMES DAILY
COMMUNITY
Start: 2025-04-10 | End: 2026-04-10

## 2025-04-17 ENCOUNTER — RESULTS FOLLOW-UP (OUTPATIENT)
Dept: OBGYN CLINIC | Facility: CLINIC | Age: 20
End: 2025-04-17

## 2025-04-17 LAB
C TRACH RRNA CVX QL NAA+PROBE: NEGATIVE
CYTOLOGIST CVX/VAG CYTO: NORMAL
DX ICD CODE: NORMAL
N GONORRHOEA RRNA CVX QL NAA+PROBE: NEGATIVE
OTHER STN SPEC: NORMAL
OTHER STN SPEC: NORMAL
PATH REPORT.FINAL DX SPEC: NORMAL
SL AMB NOTE:: NORMAL
SL AMB SPECIMEN ADEQUACY: NORMAL
SL AMB TEST METHODOLOGY: NORMAL

## 2025-04-18 NOTE — PATIENT INSTRUCTIONS
Painful periods discussed management birth control or NSAIDS Does not want birth control  Worried she has endometriosis Discussed endometriosis DX surgically first line management is birth control Don't like to jump to surgery secondline treatment is medication which puts you in menopause can only use for 2 years Do not do that without surgery first nor prior to having children  Even if she were to have surgery which confirmed endometriosis subsequent management would be birth control to keep under control  Will check US to check for abnormalities  Strongly recommend birth control   Concerned she has not gotten pregnant although has not tried Recommended if really desires to attempt pregnancy with timing of intercourse ovulation predictor

## 2025-06-06 ENCOUNTER — NURSE TRIAGE (OUTPATIENT)
Age: 20
End: 2025-06-06

## 2025-06-06 NOTE — TELEPHONE ENCOUNTER
Reason for Disposition  • Patient sounds very sick or weak to the triager    Protocols used: Pregnancy - Vaginal Bleeding Less Than 20 Weeks EGA-Adult-[place]

## 2025-06-06 NOTE — TELEPHONE ENCOUNTER
"REASON FOR CONVERSATION: Vaginal Bleeding - Pregnant    SYMPTOMS: vaginal bleeding, \"head feeling weird\"    OTHER HEALTH INFORMATION: Pt calling in, pt is pregnant LMP is 5/5/25, pt is scheduled for D&V US for today but pt missed the appt. Pt started bleeding on Tuesday 5/3/25. Pt saying she is Rh negative blood type. Pt saying she just switching the pad, and can wear one overnight but pt bleeds through it. Pt is having small clots smaller then a quarter in size for reference. Pt stating \"my head feels really weird, having dizzy spells on and off\". Pt saying currently \"I'm not dizzy my head just feels weird\". Pt denies feeling like passing out.     Epic Secure Chat sent to Dr Little- on call  Epic Secure Chat received: Go to ED     Pt is advised to go to the emergency department for further evaluation, pt stating that she will be going to [facility] for further evaluation.       PROTOCOL DISPOSITION: go to ED now     CARE ADVICE PROVIDED: Pt is provided care advice, and is notified when to call back, pt verbalized understanding.      PRACTICE FOLLOW-UP: n/a         Answer Assessment - Initial Assessment Questions  1. ONSET: \"When did this bleeding start?\"        5/3/25  2. BLEEDING SEVERITY: \"Describe the bleeding that you are having.\" \"How much bleeding is there?\"       Pt saying she just switching the pad, and can wear one overnight but pt bleeds through it. Pt is having small clots smaller then a quarter in size for reference     3. ABDOMEN PAIN: \"Do you have any pain?\" \"How bad is the pain?\"  (e.g., Scale 0-10; none, mild, moderate, or severe)      Pt denies cramping now   4. PREGNANCY: \"Do you know how many weeks or months pregnant you are?\" \"When was the first day of your last normal menstrual period?\"      LMP 5/5/ 5. ULTRASOUND: \"Have you had an ultrasound during this pregnancy?\"  Note: To confirm intrauterine pregnancy, placenta location.      N/a   6. HEMODYNAMIC STATUS: \"Are you weak or feeling " "lightheaded?\" If Yes, ask: \"Can you stand and walk normally?\"       Pt reporting having dizzy spells on and off, pt denies feeling dizzy now stating my head feels weird\"   7. OTHER SYMPTOMS: \"What other symptoms are you having with the bleeding?\" (e.g., passed tissue, vaginal discharge, fever, menstrual-type cramps)      Pt denies passed tissue, vaginal discharge, fever, menstrual-type cramps    Protocols used: Pregnancy - Vaginal Bleeding Less Than 20 Weeks EGA-Adult-[place]    "

## 2025-08-22 ENCOUNTER — TELEPHONE (OUTPATIENT)
Age: 20
End: 2025-08-22

## 2025-08-22 DIAGNOSIS — N94.6 DYSMENORRHEA: Primary | ICD-10-CM

## 2025-08-25 RX ORDER — KETOROLAC TROMETHAMINE 10 MG/1
10 TABLET, FILM COATED ORAL EVERY 6 HOURS PRN
Qty: 20 TABLET | Refills: 1 | Status: SHIPPED | OUTPATIENT
Start: 2025-08-25